# Patient Record
Sex: FEMALE
[De-identification: names, ages, dates, MRNs, and addresses within clinical notes are randomized per-mention and may not be internally consistent; named-entity substitution may affect disease eponyms.]

---

## 2021-03-12 ENCOUNTER — APPOINTMENT (OUTPATIENT)
Dept: ENDOCRINOLOGY | Facility: CLINIC | Age: 31
End: 2021-03-12
Payer: MEDICAID

## 2021-03-12 VITALS
SYSTOLIC BLOOD PRESSURE: 111 MMHG | DIASTOLIC BLOOD PRESSURE: 75 MMHG | BODY MASS INDEX: 44.39 KG/M2 | HEIGHT: 64 IN | HEART RATE: 73 BPM | TEMPERATURE: 97.6 F | OXYGEN SATURATION: 99 % | WEIGHT: 260 LBS

## 2021-03-12 DIAGNOSIS — L68.0 HIRSUTISM: ICD-10-CM

## 2021-03-12 DIAGNOSIS — E66.01 MORBID (SEVERE) OBESITY DUE TO EXCESS CALORIES: ICD-10-CM

## 2021-03-12 DIAGNOSIS — Z78.9 OTHER SPECIFIED HEALTH STATUS: ICD-10-CM

## 2021-03-12 DIAGNOSIS — R68.82 DECREASED LIBIDO: ICD-10-CM

## 2021-03-12 DIAGNOSIS — Z00.00 ENCOUNTER FOR GENERAL ADULT MEDICAL EXAMINATION W/OUT ABNORMAL FINDINGS: ICD-10-CM

## 2021-03-12 DIAGNOSIS — N89.8 OTHER SPECIFIED NONINFLAMMATORY DISORDERS OF VAGINA: ICD-10-CM

## 2021-03-12 DIAGNOSIS — Z82.49 FAMILY HISTORY OF ISCHEMIC HEART DISEASE AND OTHER DISEASES OF THE CIRCULATORY SYSTEM: ICD-10-CM

## 2021-03-12 DIAGNOSIS — R63.1 POLYDIPSIA: ICD-10-CM

## 2021-03-12 LAB — GLUCOSE BLDC GLUCOMTR-MCNC: 91

## 2021-03-12 PROCEDURE — 99072 ADDL SUPL MATRL&STAF TM PHE: CPT

## 2021-03-12 PROCEDURE — 99205 OFFICE O/P NEW HI 60 MIN: CPT | Mod: 25

## 2021-03-12 PROCEDURE — 82962 GLUCOSE BLOOD TEST: CPT

## 2021-03-12 PROCEDURE — 83036 HEMOGLOBIN GLYCOSYLATED A1C: CPT | Mod: QW

## 2021-03-12 RX ORDER — DEXAMETHASONE 1 MG/1
1 TABLET ORAL
Qty: 1 | Refills: 1 | Status: ACTIVE | COMMUNITY
Start: 2021-03-12 | End: 1900-01-01

## 2021-03-15 ENCOUNTER — NON-APPOINTMENT (OUTPATIENT)
Age: 31
End: 2021-03-15

## 2021-03-15 LAB
25(OH)D3 SERPL-MCNC: 9.8 NG/ML
ALBUMIN SERPL ELPH-MCNC: 4 G/DL
ALP BLD-CCNC: 76 U/L
ALT SERPL-CCNC: 17 U/L
AST SERPL-CCNC: 19 U/L
BILIRUB DIRECT SERPL-MCNC: 0.1 MG/DL
BILIRUB INDIRECT SERPL-MCNC: 0.2 MG/DL
BILIRUB SERPL-MCNC: 0.3 MG/DL
CREAT SERPL-MCNC: 0.6 MG/DL
ESTRADIOL SERPL-MCNC: 80 PG/ML
FSH SERPL-MCNC: 6.4 IU/L
LH SERPL-ACNC: 6.8 IU/L
PROT SERPL-MCNC: 7.6 G/DL
TSH SERPL-ACNC: 0.48 UIU/ML

## 2021-03-17 LAB
TESTOST BND SERPL-MCNC: 1.6 PG/ML
TESTOST SERPL-MCNC: 32.2 NG/DL

## 2021-03-22 LAB
17OHP SERPL-MCNC: 15 NG/DL
DHEA-SULFATE, SERUM: 101 UG/DL

## 2021-03-22 NOTE — ADDENDUM
[FreeTextEntry1] : 3/12/21: FSH 6.4, LH 6.8, estradiol 80, free testo 1.6wnl, total testo 32.2 wnl, TSH 0.48, Cr 0.6, , LFTs wnl, 25 OH vit D 9.8L, 17 OH progesterone 15 wnl, DHEAS 101
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
## 2021-03-22 NOTE — ASSESSMENT
[FreeTextEntry1] : 30yoF h/o obesity s/p sleeve gastrectomy 2016, hirsutism, vaginal dryness, low libido self-referred for evaluation and management. \par \par #Obesity: gained back most of weight lost after sleeve gastrectomy. Her surgeon would like to revise the gastrectomy but she is hesitant to proceed\par -screen for Cushing's syndrome: For the low dose 1 mg dexamethasone suppression test, the patient should take 1 mg dexamethasone by mouth at 11 pm and check 8 am cortisol and ACTH the day after dexamethasone is taken.\par \par #Hirsutism: \par -screen for hyperandrogenism and hyperandrogenic disorders\par -periods are regular but if US showed PCOS morphology and no other disorders are found she would meet PCOS criteria. I asked to get the results of the US from her gynecologist\par \par #Vaginal dryness, low libido: having normal periods now and she's been quite fertile. Estrogen deficiency is unlikely but we can evaluate.\par \par #polydipsia: screened neg for DM\par \par RTC 1 month after bloodwork results\par \par

## 2021-03-22 NOTE — PHYSICAL EXAM
[Alert] : alert [Well Nourished] : well nourished [Healthy Appearance] : healthy appearance [No Acute Distress] : no acute distress [Well Developed] : well developed [Normal Voice/Communication] : normal voice communication [EOMI] : extra ocular movement intact [No Lid Lag] : no lid lag [Normal Hearing] : hearing was normal [Thyroid Not Enlarged] : the thyroid was not enlarged [No Respiratory Distress] : no respiratory distress [No Accessory Muscle Use] : no accessory muscle use [Normal Rate and Effort] : normal respiratory rate and effort [Clear to Auscultation] : lungs were clear to auscultation bilaterally [Normal Rate] : heart rate was normal [Regular Rhythm] : with a regular rhythm [No Edema] : no peripheral edema [Soft] : abdomen soft [Normal Gait] : normal gait [Acanthosis Nigricans] : acanthosis nigricans present [No Tremors] : no tremors [Normal Affect] : the affect was normal [Normal Mood] : the mood was normal [de-identified] : pleasant young  woman [de-identified] : circumferential ring of fat on neck [de-identified] : skin-colored striae [de-identified] : coarse dark hairs on chest [de-identified] : hypoactive reflexes

## 2021-04-02 ENCOUNTER — APPOINTMENT (OUTPATIENT)
Dept: ENDOCRINOLOGY | Facility: CLINIC | Age: 31
End: 2021-04-02